# Patient Record
Sex: FEMALE | Race: OTHER
[De-identification: names, ages, dates, MRNs, and addresses within clinical notes are randomized per-mention and may not be internally consistent; named-entity substitution may affect disease eponyms.]

---

## 2018-03-01 ENCOUNTER — HOSPITAL ENCOUNTER (OUTPATIENT)
Dept: HOSPITAL 72 - EMR | Age: 18
Discharge: HOME | End: 2018-03-01
Payer: SELF-PAY

## 2018-03-01 VITALS — SYSTOLIC BLOOD PRESSURE: 124 MMHG | DIASTOLIC BLOOD PRESSURE: 70 MMHG

## 2018-03-01 VITALS — SYSTOLIC BLOOD PRESSURE: 115 MMHG | DIASTOLIC BLOOD PRESSURE: 62 MMHG

## 2018-03-01 VITALS — SYSTOLIC BLOOD PRESSURE: 136 MMHG | DIASTOLIC BLOOD PRESSURE: 77 MMHG

## 2018-03-01 VITALS — DIASTOLIC BLOOD PRESSURE: 81 MMHG | SYSTOLIC BLOOD PRESSURE: 133 MMHG

## 2018-03-01 VITALS — DIASTOLIC BLOOD PRESSURE: 83 MMHG | SYSTOLIC BLOOD PRESSURE: 138 MMHG

## 2018-03-01 VITALS
HEIGHT: 63 IN | DIASTOLIC BLOOD PRESSURE: 73 MMHG | WEIGHT: 140 LBS | SYSTOLIC BLOOD PRESSURE: 124 MMHG | BODY MASS INDEX: 24.8 KG/M2

## 2018-03-01 VITALS — DIASTOLIC BLOOD PRESSURE: 84 MMHG | SYSTOLIC BLOOD PRESSURE: 140 MMHG

## 2018-03-01 VITALS — SYSTOLIC BLOOD PRESSURE: 115 MMHG | DIASTOLIC BLOOD PRESSURE: 76 MMHG

## 2018-03-01 VITALS — DIASTOLIC BLOOD PRESSURE: 73 MMHG | SYSTOLIC BLOOD PRESSURE: 131 MMHG

## 2018-03-01 VITALS — SYSTOLIC BLOOD PRESSURE: 126 MMHG | DIASTOLIC BLOOD PRESSURE: 55 MMHG

## 2018-03-01 VITALS — DIASTOLIC BLOOD PRESSURE: 64 MMHG | SYSTOLIC BLOOD PRESSURE: 119 MMHG
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
## 2018-03-01 DIAGNOSIS — S52.242A: Primary | ICD-10-CM

## 2018-03-01 DIAGNOSIS — L40.9: ICD-10-CM
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
## 2018-03-01 LAB
ADD MANUAL DIFF: NO
ALBUMIN SERPL-MCNC: 3.9 G/DL (ref 3.4–5)
ALBUMIN/GLOB SERPL: 1 {RATIO} (ref 1–2.7)
ALP SERPL-CCNC: 87 U/L (ref 46–116)
ALT SERPL-CCNC: 22 U/L (ref 12–78)
ANION GAP SERPL CALC-SCNC: 7 MMOL/L (ref 5–15)
APPEARANCE UR: (no result)
APTT PPP: YELLOW S
AST SERPL-CCNC: 17 U/L (ref 15–37)
BASOPHILS NFR BLD AUTO: 1.3 % (ref 0–2)
BILIRUB SERPL-MCNC: 0.5 MG/DL (ref 0.2–1)
BUN SERPL-MCNC: 6 MG/DL (ref 7–18)
CALCIUM SERPL-MCNC: 9.3 MG/DL (ref 8.5–10.1)
CHLORIDE SERPL-SCNC: 102 MMOL/L (ref 98–107)
CO2 SERPL-SCNC: 28 MMOL/L (ref 21–32)
CREAT SERPL-MCNC: 0.8 MG/DL (ref 0.55–1.3)
EOSINOPHIL NFR BLD AUTO: 2 % (ref 0–3)
ERYTHROCYTE [DISTWIDTH] IN BLOOD BY AUTOMATED COUNT: 12.8 % (ref 11.6–14.8)
GLOBULIN SER-MCNC: 3.9 G/DL
GLUCOSE UR STRIP-MCNC: NEGATIVE MG/DL
HCT VFR BLD CALC: 43.2 % (ref 37–47)
HGB BLD-MCNC: 14.1 G/DL (ref 12–16)
INR PPP: 1 (ref 0.9–1.1)
KETONES UR QL STRIP: (no result)
LEUKOCYTE ESTERASE UR QL STRIP: (no result)
LYMPHOCYTES NFR BLD AUTO: 18.5 % (ref 20–45)
MCV RBC AUTO: 90 FL (ref 80–99)
MONOCYTES NFR BLD AUTO: 8 % (ref 1–10)
NEUTROPHILS NFR BLD AUTO: 70.2 % (ref 45–75)
NITRITE UR QL STRIP: NEGATIVE
PH UR STRIP: 8 [PH] (ref 4.5–8)
PLATELET # BLD: 282 K/UL (ref 150–450)
POTASSIUM SERPL-SCNC: 3.9 MMOL/L (ref 3.5–5.1)
PROT UR QL STRIP: (no result)
RBC # BLD AUTO: 4.81 M/UL (ref 4.2–5.4)
SODIUM SERPL-SCNC: 137 MMOL/L (ref 136–145)
SP GR UR STRIP: 1.01 (ref 1–1.03)
UROBILINOGEN UR-MCNC: NORMAL MG/DL (ref 0–1)
WBC # BLD AUTO: 9.6 K/UL (ref 4.8–10.8)

## 2018-03-01 PROCEDURE — 99284 EMERGENCY DEPT VISIT MOD MDM: CPT

## 2018-03-01 PROCEDURE — 96372 THER/PROPH/DIAG INJ SC/IM: CPT

## 2018-03-01 PROCEDURE — 25545 OPTX ULNAR SHFT FX INT FIXJ: CPT

## 2018-03-01 PROCEDURE — 94150 VITAL CAPACITY TEST: CPT

## 2018-03-01 PROCEDURE — 73130 X-RAY EXAM OF HAND: CPT

## 2018-03-01 PROCEDURE — 36415 COLL VENOUS BLD VENIPUNCTURE: CPT

## 2018-03-01 PROCEDURE — 85025 COMPLETE CBC W/AUTO DIFF WBC: CPT

## 2018-03-01 PROCEDURE — 96375 TX/PRO/DX INJ NEW DRUG ADDON: CPT

## 2018-03-01 PROCEDURE — 73090 X-RAY EXAM OF FOREARM: CPT

## 2018-03-01 PROCEDURE — 94003 VENT MGMT INPAT SUBQ DAY: CPT

## 2018-03-01 PROCEDURE — 11012 DEB SKIN BONE AT FX SITE: CPT

## 2018-03-01 PROCEDURE — 76001: CPT

## 2018-03-01 PROCEDURE — 81025 URINE PREGNANCY TEST: CPT

## 2018-03-01 PROCEDURE — 96365 THER/PROPH/DIAG IV INF INIT: CPT

## 2018-03-01 PROCEDURE — 81001 URINALYSIS AUTO W/SCOPE: CPT

## 2018-03-01 PROCEDURE — 85610 PROTHROMBIN TIME: CPT

## 2018-03-01 PROCEDURE — 80053 COMPREHEN METABOLIC PANEL: CPT

## 2018-03-01 PROCEDURE — 96361 HYDRATE IV INFUSION ADD-ON: CPT

## 2018-03-01 RX ADMIN — NEOMYCIN SULFATE - POLYMYXIN B SULFATE ONE ML: 40; 200000 IRRIGANT IRRIGATION at 19:48

## 2018-03-01 RX ADMIN — NEOMYCIN SULFATE - POLYMYXIN B SULFATE ONE ML: 40; 200000 IRRIGANT IRRIGATION at 19:49
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
## 2018-03-01 NOTE — BRIEF OPERATIVE NOTE
Immediate Post Operative Note


Operative Note


Pre-op Diagnosis:


Left open ulna fracture


Procedure:


Left ulna irigation and debridement with ORIF


Post-op Diagnosis:  same as pre-op


Findings:  consistent w/pre-op dx studies


Surgeon:  Addison


Anesthesia:  general, regional


Specimen:  none


Complications:  none


Condition:  stable


Fluids:  100 ml


Estimated Blood Loss:  minimal


Drains:  none


Implant(s) used?:  Yes











GRISELDA SALAZAR Mar 1, 2018 19:33

## 2018-03-01 NOTE — ANETHESIA PREOPERATIVE EVAL
Anesthesia Pre-op PMH/ROS


General


Date of Evaluation:  Mar 1, 2018


Time of Evaluation:  18:00


Anesthesiologist:  ZACK


ASA Score:  ASA 2


Mallampati Score


Class I : Soft palate, uvula, fauces, pillars visible


Class II: Soft palate, uvula, fauces visible


Class III: Soft palate, base of uvula visible


Class IV: Only hard plate visible


Mallampati Classification:  Class II


Surgeon:  MARTIN


Diagnosis:  LEFT ULNAR FRACTURE


Surgical Procedure:  ORIF LEFT ULNA


Anesthesia History:  none


Family History:  no anesthesia problems


Allergies:  


Coded Allergies:  


     No Known Allergies (Unverified , 3/1/18)


Medications:  see eMAR





Anesthesia Pre-op Phys. Exam


Physician Exam





Last Vital Signs








  Date Time  Temp Pulse Resp B/P (MAP) Pulse Ox O2 Delivery O2 Flow Rate FiO2


 


3/1/18 17:55 97.8 79 14 115/76 100 Room Air  





 97.3       








Constitutional:  NAD


Neurologic:  CN 2-12 intact


Cardiovascular:  RRR


Respiratory:  CTA


Gastrointestinal:  S/NT/ND





Airway Exam


Mallampati Score:  Class II


MO:  full


ROM:  full


Teeth:  intact





Anesthesia Pre-op A/P


Labs





Hematology








Test


  3/1/18


11:25


 


White Blood Count


  9.6 K/UL


(4.8-10.8)


 


Red Blood Count


  4.81 M/UL


(4.20-5.40)


 


Hemoglobin


  14.1 G/DL


(12.0-16.0)


 


Hematocrit


  43.2 %


(37.0-47.0)


 


Mean Corpuscular Volume 90 FL (80-99)  


 


Mean Corpuscular Hemoglobin


  29.4 PG


(27.0-31.0)


 


Mean Corpuscular Hemoglobin


Concent 32.8 G/DL


(32.0-36.0)


 


Red Cell Distribution Width


  12.8 %


(11.6-14.8)


 


Platelet Count


  282 K/UL


(150-450)


 


Mean Platelet Volume


  6.8 FL


(6.5-10.1)


 


Neutrophils (%) (Auto)


  70.2 %


(45.0-75.0)


 


Lymphocytes (%) (Auto)


  18.5 %


(20.0-45.0)  L


 


Monocytes (%) (Auto)


  8.0 %


(1.0-10.0)


 


Eosinophils (%) (Auto)


  2.0 %


(0.0-3.0)


 


Basophils (%) (Auto)


  1.3 %


(0.0-2.0)








Coagulation








Test


  3/1/18


11:25


 


Prothrombin Time


  10.0 SEC


(9.30-11.50)


 


Prothromb Time International


Ratio 1.0 (0.9-1.1)  


 








Chemistry








Test


  3/1/18


11:25


 


Sodium Level


  137 MMOL/L


(136-145)


 


Potassium Level


  3.9 MMOL/L


(3.5-5.1)


 


Chloride Level


  102 MMOL/L


()


 


Carbon Dioxide Level


  28 MMOL/L


(21-32)


 


Anion Gap


  7 mmol/L


(5-15)


 


Blood Urea Nitrogen


  6 mg/dL (7-18)


L


 


Creatinine


  0.8 MG/DL


(0.55-1.30)


 


Estimat Glomerular Filtration


Rate > 60 mL/min


(>60)


 


Glucose Level


  139 MG/DL


()  H


 


Calcium Level


  9.3 MG/DL


(8.5-10.1)


 


Total Bilirubin


  0.5 MG/DL


(0.2-1.0)


 


Aspartate Amino Transf


(AST/SGOT) 17 U/L (15-37)


 


 


Alanine Aminotransferase


(ALT/SGPT) 22 U/L (12-78)


 


 


Alkaline Phosphatase


  87 U/L


()


 


Total Protein


  7.8 G/DL


(6.4-8.2)


 


Albumin


  3.9 G/DL


(3.4-5.0)


 


Globulin 3.9 g/dL  


 


Albumin/Globulin Ratio 1.0 (1.0-2.7)  








Urine Pregnancy Test











Test


  3/1/18


12:45


 


Urine HCG, Qualitative Negative  











Risk Assessment & Plan


Plan:


GA


Status Change Before Surgery:  No





Pre-Antibiotics


Drug:  ANCEF


Given Within 1 Hr of Incision:  Yes


Time Given:  18:20











Crescencio Turpin M.D. Mar 1, 2018 19:45

## 2018-03-01 NOTE — IMMEDIATE POST-OP EVALUATION
Immediate Post-Op Evalulation


Immediate Post-Op Evalulation


Procedure:  ORIF LEFT ULNA


Date of Evaluation:  Mar 1, 2018


Time of Evaluation:  19:40


IV Fluids:  600


Blood Products:  0


Estimated Blood Loss:  10


Urinary Output:  0


Blood Pressure Systolic:  142


Blood Pressure Diastolic:  63


Pulse Rate:  69


Respiratory Rate:  16


O2 Sat by Pulse Oximetry:  99


Temperature (Fahrenheit):  98.5


Pain Score (1-10):  0


Nausea:  No


Vomiting:  No


Patient Status:  awake, reacts, patent, extubated


Hydration Status:  adequate


Drug:  ANCEF


Given Within 1 Hr of Incision:  Yes


Time Given:  18:20











Crescencio Turpin M.D. Mar 1, 2018 19:47
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
## 2018-03-01 NOTE — DIAGNOSTIC IMAGING REPORT
Indications: Trauma, pain

 

Technique: 2 or 3 views of the  left  forearm

 

Comparison: None

 

Findings: There is a transverse fracture of the proximal to mid ulnar shaft. This is

distracted by about 6 mm, and is angulated. No evidence of associated radial

fracture. There is some gas within the adjacent soft tissues. No radiopaque foreign

body demonstrated

 

Impression: Positive for proximal to mid ulnar shaft fracture, as described. Gas

within the adjacent soft tissues communicate with this is a compound fracture, or

could be related to an associated soft tissue laceration. Correlate with clinical

findings

## 2018-03-01 NOTE — PRE-PROCEDURE NOTE/ATTESTATION
Pre-Procedure Note/Attestation


Complete Prior to Procedure


Planned Procedure:  left


Procedure Narrative:


Left ulna irrigation and debridement and open reduction with internal fixation.





Indications for Procedure


Pre-Operative Diagnosis:


Left open ulna fracture





Attestation


I attest that I discussed the nature of the procedure; its benefits; risks and 

complications; and alternatives (and the risks and benefits of such alternatives

), prior to the procedure, with the patient (or the patient's legal 

representative).





I attest that, if there was a reasonable possibility of needing a blood 

transfusion, the patient (or the patient's legal representative) was given the 

[facility] standardized written summary, pursuant 

to the [place] Blood Safety Act ([place] Health and Safety Code # 1645, as 

amended).





I attest that I re-evaluated the patient just prior to the surgery and that 

there has been no change in the patient's H&P, except as documented below:











GRISELDA SALAZAR Mar 1, 2018 18:10

## 2018-03-02 NOTE — DIAGNOSTIC IMAGING REPORT
Indication: Pain Forearm pain

 

Findings: 3 views of the left forearm were obtained. Total fluoroscopic time 10.5

seconds.

 

Fluoroscopic imaging showing plate and screw reduction of a mid ulnar shaft fracture.

 

IMPRESSION:

 

Intraoperative imaging
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
